# Patient Record
(demographics unavailable — no encounter records)

---

## 2025-04-30 NOTE — HISTORY OF PRESENT ILLNESS
[de-identified] : ELSI HUNT  is a 56 year man with a history of LPR who had recent UR. He has sore  crust in nostril.

## 2025-04-30 NOTE — ASSESSMENT
[FreeTextEntry1] : ELSI HUNT has vestibulitis right side. I removed griselda cab and he will apply bacitracin for a few days.